# Patient Record
Sex: MALE | Race: WHITE
[De-identification: names, ages, dates, MRNs, and addresses within clinical notes are randomized per-mention and may not be internally consistent; named-entity substitution may affect disease eponyms.]

---

## 2021-10-11 ENCOUNTER — HOSPITAL ENCOUNTER (EMERGENCY)
Dept: HOSPITAL 35 - ER | Age: 81
Discharge: PSYCHIATRIC OR SUBSTANCE USE FACILITY | End: 2021-10-11
Payer: COMMERCIAL

## 2021-10-11 ENCOUNTER — HOSPITAL ENCOUNTER (INPATIENT)
Dept: HOSPITAL 35 - SBH | Age: 81
LOS: 18 days | Discharge: NURSING, REHAB OR LONG-TERM CARE | DRG: 881 | End: 2021-10-29
Attending: PSYCHIATRY & NEUROLOGY | Admitting: PSYCHIATRY & NEUROLOGY
Payer: COMMERCIAL

## 2021-10-11 VITALS — HEIGHT: 71 IN | BODY MASS INDEX: 28.84 KG/M2 | WEIGHT: 206 LBS

## 2021-10-11 VITALS — BODY MASS INDEX: 30.6 KG/M2 | HEIGHT: 72 IN | WEIGHT: 225.91 LBS

## 2021-10-11 VITALS — DIASTOLIC BLOOD PRESSURE: 83 MMHG | SYSTOLIC BLOOD PRESSURE: 153 MMHG

## 2021-10-11 DIAGNOSIS — G62.9: ICD-10-CM

## 2021-10-11 DIAGNOSIS — Z79.899: ICD-10-CM

## 2021-10-11 DIAGNOSIS — I25.2: ICD-10-CM

## 2021-10-11 DIAGNOSIS — Z79.82: ICD-10-CM

## 2021-10-11 DIAGNOSIS — Z66: ICD-10-CM
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
## 2021-10-11 DIAGNOSIS — E78.5: ICD-10-CM
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
## 2021-10-11 DIAGNOSIS — S42.301A: ICD-10-CM
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
## 2021-10-11 DIAGNOSIS — I25.10: ICD-10-CM

## 2021-10-11 DIAGNOSIS — F01.51: ICD-10-CM

## 2021-10-11 DIAGNOSIS — G47.00: ICD-10-CM
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
## 2021-10-11 DIAGNOSIS — Z79.1: ICD-10-CM

## 2021-10-11 DIAGNOSIS — I10: ICD-10-CM

## 2021-10-11 DIAGNOSIS — F32.9: Primary | ICD-10-CM

## 2021-10-11 DIAGNOSIS — R45.851: ICD-10-CM

## 2021-10-11 DIAGNOSIS — D72.829: ICD-10-CM

## 2021-10-11 DIAGNOSIS — Z20.822: ICD-10-CM

## 2021-10-11 DIAGNOSIS — N40.0: ICD-10-CM

## 2021-10-11 DIAGNOSIS — R41.82: Primary | ICD-10-CM

## 2021-10-11 DIAGNOSIS — K21.9: ICD-10-CM

## 2021-10-11 DIAGNOSIS — D64.9: ICD-10-CM

## 2021-10-11 DIAGNOSIS — Z79.891: ICD-10-CM

## 2021-10-11 DIAGNOSIS — Z28.21: ICD-10-CM

## 2021-10-11 LAB
ANION GAP SERPL CALC-SCNC: 9 MMOL/L (ref 7–16)
BASOPHILS NFR BLD AUTO: 0.3 % (ref 0–2)
BILIRUB UR-MCNC: NEGATIVE MG/DL
BUN SERPL-MCNC: 18 MG/DL (ref 7–18)
CALCIUM SERPL-MCNC: 9.3 MG/DL (ref 8.5–10.1)
CHLORIDE SERPL-SCNC: 101 MMOL/L (ref 98–107)
CO2 SERPL-SCNC: 29 MMOL/L (ref 21–32)
COLOR UR: YELLOW
CREAT SERPL-MCNC: 1 MG/DL (ref 0.7–1.3)
EOSINOPHIL NFR BLD: 0.3 % (ref 0–3)
ERYTHROCYTE [DISTWIDTH] IN BLOOD BY AUTOMATED COUNT: 13.8 % (ref 10.5–14.5)
GLUCOSE SERPL-MCNC: 132 MG/DL (ref 74–106)
GRANULOCYTES NFR BLD MANUAL: 80.9 % (ref 36–66)
HCT VFR BLD CALC: 39.9 % (ref 42–52)
HGB BLD-MCNC: 13.3 GM/DL (ref 14–18)
KETONES UR STRIP-MCNC: (no result) MG/DL
LYMPHOCYTES NFR BLD AUTO: 4.9 % (ref 24–44)
MCH RBC QN AUTO: 34.1 PG (ref 26–34)
MCHC RBC AUTO-ENTMCNC: 33.3 G/DL (ref 28–37)
MCV RBC: 102.3 FL (ref 80–100)
MONOCYTES NFR BLD: 13.6 % (ref 1–8)
NEUTROPHILS # BLD: 12.9 THOU/UL (ref 1.4–8.2)
PLATELET # BLD: 201 THOU/UL (ref 150–400)
POTASSIUM SERPL-SCNC: 4.7 MMOL/L (ref 3.5–5.1)
RBC # BLD AUTO: 3.9 MIL/UL (ref 4.5–6)
RBC # UR STRIP: (no result) /UL
RBC #/AREA URNS HPF: (no result) /HPF
SODIUM SERPL-SCNC: 139 MMOL/L (ref 136–145)
SP GR UR STRIP: >= 1.03 (ref 1–1.03)
URINE CLARITY: CLEAR
URINE GLUCOSE-RANDOM*: NEGATIVE
URINE LEUKOCYTES-REFLEX: NEGATIVE
URINE NITRITE-REFLEX: NEGATIVE
URINE PROTEIN (DIPSTICK): (no result)
URINE WBC-REFLEX: (no result) /HPF (ref 0–5)
UROBILINOGEN UR STRIP-ACNC: 1 E.U./DL (ref 0.2–1)
WBC # BLD AUTO: 16 THOU/UL (ref 4–11)

## 2021-10-11 PROCEDURE — 10880: CPT

## 2021-10-12 VITALS — SYSTOLIC BLOOD PRESSURE: 142 MMHG | DIASTOLIC BLOOD PRESSURE: 56 MMHG

## 2021-10-12 VITALS — DIASTOLIC BLOOD PRESSURE: 55 MMHG | SYSTOLIC BLOOD PRESSURE: 147 MMHG

## 2021-10-12 LAB
BACTERIA #/AREA URNS HPF: (no result) /HPF
BILIRUB UR-MCNC: NEGATIVE MG/DL
CHOLEST SERPL-MCNC: 96 MG/DL (ref ?–200)
COLOR UR: YELLOW
HDLC SERPL-MCNC: 37 MG/DL (ref 40–?)
KETONES UR STRIP-MCNC: NEGATIVE MG/DL
LDLC SERPL-MCNC: 44 MG/DL (ref ?–100)
MUCUS: (no result) STRN/LPF
NITRITE UR QL STRIP: NEGATIVE
RBC # UR STRIP: (no result) /UL
RBC #/AREA URNS HPF: (no result) /HPF
SP GR UR STRIP: >= 1.03 (ref 1–1.03)
SQUAMOUS: (no result) /LPF (ref 0–3)
TC:HDL: 2.6 RATIO
TRIGL SERPL-MCNC: 75 MG/DL (ref ?–150)
URINE CLARITY: CLEAR
URINE GLUCOSE-RANDOM*: NEGATIVE
URINE LEUKOCYTES: NEGATIVE
URINE PROTEIN (DIPSTICK): (no result)
UROBILINOGEN UR STRIP-ACNC: 1 E.U./DL (ref 0.2–1)
VLDLC SERPL CALC-MCNC: 15 MG/DL (ref ?–40)
WBC #/AREA URNS HPF: (no result) /HPF

## 2021-10-12 NOTE — NUR
PATIENT CAME INTO THE UNIT AT 1900. HE WAS ADMITED TO  UNDER THE CARE
DR LOVE.HE DENIES PAINS AND LUNGS ARE CLEAR BS ACTIVE X4 QUADS. HE IS
IS ALERT AND ORIENTED. MEDICATION ARE VERIFIED AND DOCUMENTED PER ORDER
 HE DENIES PAINS AND DISCOMFORT AT THIS TIME.DENIES SI/AVH/AH. CALL TO
HOSPITALIST AND SHE SAW THE PATIENT. NO SKIN ISSUE NOTED ON ASSESSMENT,BS
ACTIVE X4 QUADS, ABD IS SOFT NONE TENDER. HE IS COMFORTABLE WITH CARE AND
ADMISSION. PATIENT IS O FALL PRECAUTION. BED IS LOW, LOCKED AND ALARMED.
YELLOW AND SOCKS AND TOP ON . [id number] CHECK IS ONGOING. PERSONAL
ITEMS ARE NOTED.CONTINE CARE.

## 2021-10-12 NOTE — EKG
[address] [place]  [address]
Phone:  [phone]                    ELECTROCARDIOGRAM REPORT      
_______________________________________________________________________________
 
Name:       TAMIKO DELCID                  Room #:                     DEP FELIPE DOUGLASS#:      [id number]     Account #:      [id number]  
Admission:  10/11/21    Attend Phys:                          
Discharge:  10/11/21    Date of Birth:  [birth year]  
                                                          Report #: [id number]
   [id number]
_______________________________________________________________________________
                         [facility] ED
                                       
Test Date:    2021-10-11               Test Time:    16:16:34
Pat Name:     TAMIKO DELCID             Department:   
Patient ID:   [id number]            Room:          
Gender:       M                        Technician:   
:          [id number]               Requested By: Kike Mccarthy
Order Number: [id number] MD:   Carlos Jensen
                                 Measurements
Intervals                              Axis          
Rate:         84                       P:            46
CA:           155                      QRS:          63
QRSD:         97                       T:            4
QT:           353                                    
QTc:          418                                    
                           Interpretive Statements
Sinus rhythm
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
No previous ECG available for comparison
Electronically Signed On 10- 7:30:11 CDT by Carlos Jensen
[email or web address]
 
 
 
 
 
 
 
 
 
 
 
 
 
 
 
 
 
 
 
 
 
 
  <ELECTRONICALLY SIGNED>
   By: Carlos Jensen MD, [facility]    
  10/12/21     0730
D: 10/11/21 1616                           _____________________________________
T: 10/11/21 1616                           Carlos Jensen MD, FACC      /EPI

## 2021-10-12 NOTE — NUR
PATIENT CARE ASSUMED AT 0700, PATIENT SITTING IN THE DAY ROOM, ALERT AND
ORIENTED X3, PATIENT IS HARD OF HEARING EVEN WITH THE EARING AID, PATIENT IS
CALM AND COOPERATIVE WITH CARE, ACTIVE BOWEL SOUND WITH SOFT AND ROUNDED
AMBDOMEN, BRATH SOUND CLEAR, SKIN INTACT, NO EDEMA NOTED, PATIENT TOOK HIS
MEDICATION WHOLE WITHOUT ANY PROBLEM, PATIENT AMBULATE ON A CORBIN CHAIR, HE IS
2 TO 3 PERSON ASSIST, PATIENT RUNNING TEMP OF 99.6 .1, DOCTOR ARTEM
WAS NOTIFIED, HE ORDERED UA, THE RESULT CAME BACK WITH PROTEIN AND BLOOD IN
URINE, FALL PRECAUTION IN PLACE, NO BEHAVIOR CHANGES, PATIENT DENIES SI/HI,
WILL CONTINUE TO MONITOR PATIENT FOR SAFETY AND BEHAVIOR

## 2021-10-13 VITALS — SYSTOLIC BLOOD PRESSURE: 148 MMHG | DIASTOLIC BLOOD PRESSURE: 58 MMHG

## 2021-10-13 VITALS — DIASTOLIC BLOOD PRESSURE: 76 MMHG | SYSTOLIC BLOOD PRESSURE: 154 MMHG

## 2021-10-13 LAB
EST. AVERAGE GLUCOSE BLD GHB EST-MCNC: 111 MG/DL
GLYCOHEMOGLOBIN (HGB A1C): 5.5 % (ref 4.8–5.6)

## 2021-10-13 NOTE — NUR
New [facility] admit for depression/SI.  Advanced age 81, very hard hearing.  Febrile
past 2 days.  Intake 75% lunch yesterday with refusal dinner.  2 different wts
at admit 225 vs 204 lb. BMI 29. Follow wt/intake patterns but presents low
nutrition risk.

## 2021-10-13 NOTE — NUR
10---1600--Call from [facility], Darren (son)--[phone]--I advised him that
the test for flu that was done came back negative.  Son expressed concerns
about him not being up and moving and not having his cane (which is what he
uses at home.  He can't use a walker due to the injury to his left arm. Son
wondered if PT/OT could be ordered so he can get up and moving.  He is
concerned that a week prior to him coming here he appeared to be declining and
he didn't want him to slip further.  Advised son I will let the doctor know
his concerns and see of he wants to order PT/OT for him when he feels well
enough.

## 2021-10-13 NOTE — NUR
PATIENT HAS BEEN UP, AND OUT IN [facility], SITTING IN [facility], PARTICIPATES IN
GROUP THERAPY.  PATIENT TOOK ALL MEDICATION WHOLE WITHOUT DIFFICULTY, HE IS
EATING MEALS, AND DRINKING FLUID WELL. PATIENT DENIES SUICIDAL/HOMICIDAL
IDEATION. PATIENT REQUIRES MAX ASSIST OF STAFF TO TRANSFER, AND PROVIDE ADL
CARE. PATIENT'S TEMPRATURE THIS MORNING .3, TYLENOL GIVEN FOR LOWER
BACK PAIN, TEMP RECHECKED WITH RESULT OF 97.7. DR. LOVE AWARE. FLU SWAB
COMPLETED, SENT TO LAB, AWAITING RESULT. PATIENT IS TO ISOLATE TO ROOM TILL
THE FLU RESULT IS OUT. AFFECT IS FLAT/BLUNTED, MOOD IS CALM, EUTHYMIC. PATIENT
DENIES SUICIDAL IDEATION, ACKNOWLEDGES DEPRESSION, BUT NOT ABLE TO RATE IT. HE
STATES HE IS "ANXIOUS TO GO HOME". NO AGITATION OR AGGRESSIVE BEHAVIOR NOTED
AT THIS TIME, WILL MONITOR FOR SAFETY.

## 2021-10-13 NOTE — NUR
PATIENT SITTING UP IN DAY ROOM AT BEGINNING OF SHIFT, WHEN DOING ASSESSMENT,
PATIENT IS UNABLE TO FOLLOW ALONG IN CONVERSATION, WAS UNABLE TO ANSWER
ASSESSMENT QUESTIONS. TOOK 2 STAFF MEMBERS TO PUT IN BED WHERE HE IS IS
RESTING COMFORTABLY IN BED.

## 2021-10-13 NOTE — NUR
PATIENT SITTING IN RECLINER IN COMMON AREA. PATIENT IS ALERT AND ORIENTED TO
PERSON. PATIENT AT THIS TIME IS CALM AND COOPERATIVE. NOTICED THAT HE HAS MILD
TREMORS WHEN ADMINISTERING HIS HS MEDS. PATIENT REQUIRES ASSISTANCE TO
TRANSFER FROM CHAIR TO BED. DENIES PAIN OR NEEDS. NO DISTRESS NOTED. WILL
CONTINUE TO MONITOR FOR CHANGE IN STATUS.

## 2021-10-14 VITALS — DIASTOLIC BLOOD PRESSURE: 52 MMHG | SYSTOLIC BLOOD PRESSURE: 134 MMHG

## 2021-10-14 VITALS — DIASTOLIC BLOOD PRESSURE: 52 MMHG | SYSTOLIC BLOOD PRESSURE: 113 MMHG

## 2021-10-14 NOTE — NUR
10---5359--Went to do the SLUMS with patient.  He was asleep and when I
woke him up he stated he wanted to do it tomorrow AM.  I told him I would come
back after breakfast.

## 2021-10-14 NOTE — NUR
10---1115--Asked doctor about ordering OT and PT for the patient as
requested by NOAM, Darren (son).  Doctor checked and it has already been
started.

## 2021-10-14 NOTE — NUR
PATIENT RESTING IN THE COMMON AREA WATCHING TV. PT IS CALM AND COOPERATIVE. HE
IS AAOX2. COMPLIANT WITH MEDICATIONS AND TREATMENT. VSS. NO S/S OF DISTRESS.
PT IS UNSTEADY ON HIS FEET AND REQUIRES ASSISTANCE X2 WHEN TRANSFERRING.
PATIENT IS INCONTINENT. WILL CONTINUE TO MONITOR FOR CHANGES IN STATUS.

## 2021-10-14 NOTE — NUR
PATIENT CARE RESUMMED AT 0700. PATIENT IS A&O*2, LUNGS CLEAR, ABDOMEN SOFT
WITH BOWEL SOUNDS PRESENT IN ALL 4 QUADRANTS. PATIENT STATED HE SLEPT WELL.
PATIENT DENIES SI/HI/VH/AH. PATIENT STATED HE IS ANXIOUS ABOUT "GETTING A CAT
SCAN DONE." PATIENT UNSURE OF WHY HE IS HERE ON OUR UNIT. PATIENT STATES HE IS
A LITTLE DEPRESSED, SCALING AS A 1 ON A SCALE OF 0-10. PATIENT SAYS HE IS JUST
"LOOKING FORWARD TO MOVING ON." PATIENT SEEN BY OT TODAY, AND PARTICIPATED
WELL IN THERAPY. PATIENT IS HARD OF HEARING AND ASKS TO TAKE MASK OFF SO HE
CAN READ LIPS. 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 FALL PREVENTIONS
ARE IN PLACE, WILL CONTIUE TO MONITOR PATIENT FOR SAFETY AND BEHAVIORS.

## 2021-10-14 NOTE — NUR
10---1530--Call from DarrenSUHAMARGOT and his wife, Berto.  Darren was
wondering if we were still going to do the family meeting.  I told him we can
do that tomorrow and asked if 1430 would be okay.  He was agreeable to the
time and I told him I would talk to the doctor to determine if that time
worked for him.  Darren and his wife stated they were concerned that Ana Lilia
(wife) and the patient can't talk to each other on the portable phone on the
unit and she has never used TTD as this is technology that she is not
comfortable with. Darren states that he thinks patient's phone is not in
security but Ana Lilia has it.  He states the patient's phone and her hearing
aides are "bluetoothed" together. so they can talk together with his phone. He
will have Ana Lilia bring it tomorrow. (I will talk with my supervisor in the AM
to see if we can keep the phone in her office and I will supervise the phone
calls or the patient can do it in my office.)  I explained that we can use
an office in the outside of the unit so Ana Lilia can be present.  However I
explained the paitent will not be present and they cannot visit him.  Darren
wants to know why patient had the fever.  I told him I didn't have the answer
to that and to write down questions he has to ask tomorrow.  I told him
patient had a test for COVID and he flu and he was negative for both.  Son was
very concerned if his dad is going to get back to where he was a week and a
half ago.  I explained this was another question for the doctor but people
woith dementia can have something happen and then deteriorate quickly.  I
asked what his expectations were.  He would like for him to go home to Ana Lilia
but feels we need to be looking for a placement if he is going to be too much
for her to care for. I asked if Ana Lilia would consider an AL with him and he
thought she would.  I explained this is another question for the doctor.  I
assured him that I can start sendong out packets to AL facilities as soon as
they tell me where and see if the doctor thinks this is necessary yet.  Darren
wants to know "what's next".  I attempted to explain we can't tell him that
with any certainty.  He stated his understanding and is anxious for the
meeting tomorrow.
I explained PT/OT had done evals today and
we would get a report tomorrow in team meeting.  I explained they only see our
people M-W-F not daily.

## 2021-10-15 VITALS — SYSTOLIC BLOOD PRESSURE: 118 MMHG | DIASTOLIC BLOOD PRESSURE: 51 MMHG

## 2021-10-15 VITALS — DIASTOLIC BLOOD PRESSURE: 68 MMHG | SYSTOLIC BLOOD PRESSURE: 128 MMHG

## 2021-10-15 NOTE — NUR
Alert and orientated to person and knows he is in hospital.  Denies SI/HI.
Asking about eye drops and also asked about CPAP per another RN.  Able to
stand with assistance.  Breath sounds clear.  Reg HR auscultated.  Color pink
with brisk capillary refill and palpable peripheral pulses.  +1 edema in lower
legs, elevated.  Brief dry.  Active bowel sounds over soft, rounded abdomen.
BM last night per report.
 
Contacted son and DPOA Darren r/t eye drops.  He asked if pt had used CPAP
previous night.  Explained that per Josi in team meeting that he was not
using CPAP during admission.  Son disagreed and thought he was on CPAP.
Clarified with Josi and Dr. Natarajan, will discuss with son during family
meeting this afternoon.
 
Pt. sleeping most of day in recliner in day room.  No s/o distress.

## 2021-10-15 NOTE — NUR
PATIENT RESTING IN THE COMMON AREA WATCHING TV. PT IS AAOX2. STATES THAT HE
WAS TIRED AND WANTED TO GO TO BED. PT TOOK HIS MEDICATIONS AS ORDERED. PT WAS
TRANSFERRED TO HIS ROOM. PT DENIES PAIN OR NEEDS AT THIS TIME. VSS. NO
DISTRESS NOTED. WILL CONTINUE TO MONITOR.

## 2021-10-15 NOTE — NUR
10---0800--Spoke to supervisor re: the patient having his phone since
his wife's hearing aides were bluetoothed to his phone.  She is agreeable to
this.  She states she will keep it in her office if necessary.

## 2021-10-15 NOTE — NUR
10---2270--Patient was in the dayroom. He was sittoin dolores his
micha-chair, asleep.  Woke patient up and explained we would be completing the
SLUMS this morning.  He was agreeable. He appeared groggy. SLUMS completed but
I didn't score it because the clock didn't have all the numbers on it.  He
only put the 12-3-6 and 9.  It did appear he got the time right.  Even with
this four points added in he will only score 14 which indicates substantial
dementia.  Will talk to doctor at team meeting this morning.

## 2021-10-16 VITALS — DIASTOLIC BLOOD PRESSURE: 54 MMHG | SYSTOLIC BLOOD PRESSURE: 125 MMHG

## 2021-10-16 NOTE — NUR
Alert and orientated to person and place.  Multiple requests to speak with
wife, called wife x2 and son this AM.  Denies SI/HI.  Slightly irritable this
AM but displaying humor later in AM.  Conversive and participating in activity
group.  Breath sounds clear.  Reg HR auscultated.  Color pink with brisk
capillary refill and palpable peripheral pulses.  +1-2 edema in lower
extremities.  Incontinent of yellow urine and large, soft dk brown stool.
Active bowel sounds over soft, rounded abdomen.  Able to stand several times
and transfer to  from recliner, pulling himself around in WC.  Currently
sitting in  watching TV.

## 2021-10-16 NOTE — NUR
PATIENT RESTING IN THE COMMON AREA. STATES THAT HE IS READY FOR BED UPON HS
SHIFT ARRIVAL. TALKED WITH PATIENT THAT HE NEEDED HID MEDICATION BEFORE GOING
TO BED. PT COMPLIED. TOOK ALL MEDICATIONS. WAS TRANSFERED TO HIS ROOM VIA
WHEELCHAIR. PATIENT DENIES PAIN OR NEEDS AT THIS TIME. NO DISTRESS NOTED. HE
IS AAOX3. WILL CONTINUE TO MONITOR FOR CHANGES IN STATUS.

## 2021-10-17 VITALS — SYSTOLIC BLOOD PRESSURE: 146 MMHG | DIASTOLIC BLOOD PRESSURE: 69 MMHG

## 2021-10-17 VITALS — DIASTOLIC BLOOD PRESSURE: 69 MMHG | SYSTOLIC BLOOD PRESSURE: 146 MMHG

## 2021-10-17 VITALS — DIASTOLIC BLOOD PRESSURE: 77 MMHG | SYSTOLIC BLOOD PRESSURE: 154 MMHG

## 2021-10-17 NOTE — NUR
This RN was sitting with pt while he used his personal cell phone to talk to
his wife, as this was previously approved for pt. Pt began to appear worked
up and raising his voice to his wife and son. He told his wife "If you don't
come up here right now to talk to me, I'm commiting suicide". This RN tried to
explain to pt that we are not currently having visiting hours and that it is
not her fault she cannot visit. Pt appeared very focused on this and did not
appear to care to listen to this explaination. It was also explained to pt to
please try and remain calm on the phone. He also mentioned not eating today
and that he was not going to eat tomorrow, stating "How long do you think I'll
last!?". Pt also voiced mutiple complaints about being here and pt's family
tried to explain to him he is here due to his suicidal thoughts and that it is
not safe for pt to return home at this time, as pt was demanding them to come
pick him up. Pt also made a comment of "I wish I would've just went to the
basement". At the end of the phone call pt stated "well can you guys promise
me to call everyday?" and the family agreed. Pt's RN, Mae, then called pt's
wife to give her an update and discuss the conversation pt just had with them.

## 2021-10-17 NOTE — NUR
PATIENT CARE RESUMMED, PATIENT UP IN CORBIN-CHAIR FOR MEALS AND GROUPS. PATIENT
AMBULATORY TO AND FROM BATHROOM WITH 1-ASSIST, WALKER AND GAIT BELT. PATIENT
IS UNSTEADY AT TIMES. PATIENT A&O*2 (SELF & TIME), LAST BOWEL MOVEMENT NOTED
ON 10/17/21. PATIENT DENIES HAVING ANY PAIN AT THIS, BUT DID STATE TO THE LPN
"I HAVE CHRONIC BACK PAIN, AND IT DOESN'T HURT UNLESS IM IN THE WRONG
POSITION." PATIENT STATED HE SLEPT THE BEST HE EVER DID LAST NIGHT. PATIENT
PRESENTED IN A PLEASENT MOOD, SMILING AND HAPPY. PATIENT IS MEDICATION AND
MEAL COMPLIANT. DENIES SI/HI/AH/VH, DEPRESSION, AND ANXIETY. PATIENTS LUNG
SOUNDS ARE CLEAR, ABDOMEN IS SOFT, AND BOWEL SOUNDS ARE PRESENT IN ALL 4
QUADRANTS. PATIENT DID RECIEVE A PHONE CALL TODAY FROM HIS WIFE, WHICH DID
SEEM TO CAUSE THE PATIENT SOME DISTRESS. PATIENT WITNESSED RAISING HIS VOICE
TO HIS WIFE TO GET HIM THE HELL OUT OF HERE, AND GET HERE WITH THE PAPERWORK
TO GET HIM OUT. LAVELLE LATER CALMED DOWN, AND TOOK A NAP IN HIS CORBIN-CHAIR
WATCHING THE [employer] GAME. FALL PRECAUIONS ARE IN PLACE, WILL CONTINUE TO
MONITOR THE PATIENT FOR SAFETY AND BEHAVIORS.

## 2021-10-18 VITALS — SYSTOLIC BLOOD PRESSURE: 145 MMHG | DIASTOLIC BLOOD PRESSURE: 73 MMHG

## 2021-10-18 VITALS — DIASTOLIC BLOOD PRESSURE: 80 MMHG | SYSTOLIC BLOOD PRESSURE: 154 MMHG

## 2021-10-18 NOTE — NUR
10---1105--Call to Darren to obtain Ana Lilia's number ([phone]) He will
be at her house for call at 3:00 PM.  He states she and his wife, Berto, are
out looking at facilities today and will let me know what they find so
paperwork can be sent.

## 2021-10-18 NOTE — NUR
10---1500--Family meeting in the building--Patient not present--Meeting
in the building because of wife's hearing deficit.  Also present: Patient's
son Darren; Darren's wife was on the phone; Dr. Natarajan and this worker.
Educated family about the possible need for skilled nursing when he is DC'd
from here.  Doctor explained the issues (not walking; PT just starting)
Family to discuss facillities they might be interested.  Son will go to
patient's wife house for phone call to tell worker what has been decided. He
will help patient's wife if she can't hear.

## 2021-10-18 NOTE — NUR
PATIENT SAT UP IN CORBIN CHAIR AT A TABLE IN DINING ROOM THIS EVENING UNTIL
BEDTIME. PATIENT IS A/0X4. PATIENT HIS [place] AND WEARS 2 HEARING AIDS. ORDER
RECEIVED FOR OKAY FOR PATIENT TO USE CPAP MACHINE WITHOUT A 1:1 AS LONG AS NO
SI AND ROOM IS CLOSE TO NURSE STATION FOR OBSERVATION. PATIENT DENIES SI/HI TO
THIS NURSE. HOWEVER, DAY NURSE REPORTS THAT WHEN HE TALKS WITH FAMILY HE
THREATENS TO KILL HIMSELF IF THEY DON'T COME GET HIM AND HE TELLS FAMILY THAT
WE ARE NOT FEEDING HIM WHEN HE HAS BEEN EATING VERY WELL. BELIEVE PATIENT IS
TRYING TO MANIPULATE FAMILY TO CATER TO HIM. PATIENT HAS BEEN CALM AND
COOPERATIVE TONIGHT. PATIENT EAGER TO START PT TO AMBULATE WITH WALKER
HOPEFULLY SOON. PATIENT IS UP TO BSC WITH ASSIST X 1-2. HE DENIES PAIN. DENIES
SI/HI/AVH. CPAP PLACED ON PATIENT AT BED TIME AND WORKING. PT REMOVES MASK AND
PUTS BACK ON PERIODICALLY. PATIENT AWOKE FOR TIME CHECK AND WATER TO DRINK.
CONTINUES TO BE PLEASANT AND COOPERATIVE. HE HAD HS SNACK AND TOOK HIS MEDS
WHOLE WITH WATER. BED IN LOW POSITION AND BED ALARM IS ON. ROUTINE ROUNDS TO
ASSESS SAFETY AND STATUS OF PATIENT. CHAIR ALARM WAS ON AND IN PLACE WHEN
PATIENT WAS UP IN CORBIN CHAIR.

## 2021-10-18 NOTE — NUR
Assumed pt care at 0700. pt was alert and oriented x4. Assessments completed,
vss. Lungs clear, active bowel sound. Denies si/hi, denies pain at this time.
Took meds whole, no difficulty noted. Ambulates with a Delmy chair. Calm and
cooperative with care. Continent of bowel and bladder. Make needs known to
staff. Pt son called to speak to pt. Phone was handed to pt. At this time pt
is in room resting will continue to monitor.

## 2021-10-18 NOTE — NUR
10---1500--Call to Ana Lilia (patient's wife) ([phone]) with patient's
son (NOAM) Aura and his wife Berto on the phone.  They called about a few
homes today that have not called them back.  They would like me to fax his
information to four facilitiesdd:  [facility]; [facility];
[facility]; and [facility].  They wanted
copies of OT/PT reports e-mailed to aura at [email or web address].  Let MD no
he can do thois but OI can not due to HIPPA.  They need to request these
through Medical records ([phone]).  I mentioned that patient had refused
therapy with the PT.  All stated we told them we would call them immediately
if this happened and they would talk to him.  Explained therapists do not come
to us immediately with this information.  I didn't know if they would try
later in the day.  Family was angry that I hadn't called them immediately when
he refused because the doctor and I said we would.
10---2646--Call to NOAM Aura and advised I will ask doctor to email
reports otherwise they would have to ask for them from medical records.

## 2021-10-18 NOTE — NUR
10---1115--Call from Ana Lilia (wife) wanting to leave patient a
message--"I love you and your son and I are getting things done".  Message
given to patient.  He was happy to receive it.  Reminded Ana Liila that we have a
meeting on the phone at 3 and Darren will be at her house to help her with the
call.  She stated she remembers.

## 2021-10-18 NOTE — NUR
RT Progress Note- Thom has been present in the milieu each day since his
admission. He has also been present in group therapy, however his
participation is limited at times d/t functioning of his hearing aids. He
expresses difficulty hearing in group situations where there is background
noise. Thom has not had any behaviors or statements of suicide during RT
programming. [facility] will continue to encourage his participation.

## 2021-10-19 VITALS — DIASTOLIC BLOOD PRESSURE: 73 MMHG | SYSTOLIC BLOOD PRESSURE: 150 MMHG

## 2021-10-19 VITALS — SYSTOLIC BLOOD PRESSURE: 137 MMHG | DIASTOLIC BLOOD PRESSURE: 68 MMHG

## 2021-10-19 VITALS — SYSTOLIC BLOOD PRESSURE: 150 MMHG | DIASTOLIC BLOOD PRESSURE: 73 MMHG

## 2021-10-19 NOTE — NUR
Followup: remains on SBU.  No longer febrile.  Intake is % now.  New wt
of 206 lb, up 2 lb from last weight. Remains on regular diet.  Low nutrition
risk

## 2021-10-19 NOTE — NUR
PATIENT APPEARS ALERT AND ORIENTED BUT AT TIMES IS FORGETFUL. PATIENT TAKEN
DOWNSTAIRS THIS EVENING FOR A CT SCAN. PATIENT IS MEICATION COMPLIANT AND USES
A CORBIN CHAIR FOR GETTING AROUND. PATIENT HAS BEEN CALM AND COOPERATIVE DURING
THIS SHIFT.

## 2021-10-19 NOTE — NUR
10---1230--Call from [facility].  Message left for me
stating they werer going to deny the referal for this pagtient as they
couldn't meet his care needs and they didn't have long term care.

## 2021-10-19 NOTE — NUR
PATIENT CARE RESUMMED, PATIENT IN BED LYING IN A LOW FOWLERS POSITION. PATIENT
MEDICATION AND MEAL COMPLIANT. PATIENT PRESENTED TO LPN IN A POSITIVE, HAPPY,
SMILING APPERANCE. PATIENT IS A&O*4, ABDOMEN IS SOFT WITH BOWEL SOUNDS
PRESENT, LUNG SOUNDS ARE CLEAR. PATIENT DENIES SI/HI/AVH, DENIES
DEPRESSION/ANXIETY, AND DENIES HAVING ANY PAIN. PATIENT HAD HEARING AIDS AND
SHOES ON TODAY. PATIENT WAS SEEN BY OT AND PT TODAY. PATIENT IS CONTINENT OF
BOWEL AND BLADDER AND ALERTS STAFF WHEN ASSISTANCE IS NEEDED. PATIENT NOTED TO
HAVE DRY/WHITE MUCOUS MEMBRANES, INCREASED FLUIDS HAVE BEEN NOTED. PATIENTS
VITALS ARE WNL. FALL PRECAUTIONS ARE IN PLACE, WILL CONTINUE TO MONITOR
PATIENT FOR SAFETY AND BEHAVIORS.

## 2021-10-19 NOTE — NUR
I have reviewed the documentation by SAMANTHA COREAS from 10/19/21 to
10/19/21 and I concur with it.
YULY ARANA

## 2021-10-20 VITALS — SYSTOLIC BLOOD PRESSURE: 133 MMHG | DIASTOLIC BLOOD PRESSURE: 69 MMHG

## 2021-10-20 VITALS — DIASTOLIC BLOOD PRESSURE: 63 MMHG | SYSTOLIC BLOOD PRESSURE: 143 MMHG

## 2021-10-20 VITALS — SYSTOLIC BLOOD PRESSURE: 143 MMHG | DIASTOLIC BLOOD PRESSURE: 63 MMHG

## 2021-10-20 NOTE — NUR
10---0700--Message left from patient's son re: [email or web address] and stating he
thought I was going to call him daily with updates (something I never agreed
to).  He wanted to know about facilities accepting the patient and so far the
only one who called after referrals sent to facilities they requested was [facility] and they denied.  Will call other facilities this AM and return his
call. He stated he had been expecting him to be DC'd somewhere today, "but I
suppose that isn't going to happen now".

## 2021-10-20 NOTE — NUR
10---6952--Call to patient's son as requested in his message from
yesterday.  Advised the only one that responded out of the five I sent was [facility] and they denied.  I asked if he had others he wanted me to try and
explained I would call the other four after 0900 today and try to talk to
someone in admissions.  He stated his Wife had talked to a facility that was
supposed to "reach out" to me and I advised I have had no calls or messages
from anyone except [facility] (as stated above) but I would check with the other SW
to see if the call might have gone to her when she comes in.

## 2021-10-20 NOTE — NUR
PATIENT WAS OVERHEARD ON THE PHONE WITH WIFE ASKING HERE TO PICK HIM UP AND IF
SHE DIDN'T THAT HE WAS GOING TO KILL HIMSELF. WAS TOLD IN REPORT THAT HE SAYS
THE SAMETHING EVERYTIME HE IS TALKING TO WIFE OR SON. I SIT DOWN WITH PATIENT
AND ASKED WHY HE KEPT TALKING ABOUT KILLING HIMSELF WITH HIS FAMILY AND DENIED
SI TO US. PATIENT STATED HE GOT ANGRY BECAUSE THEY WOULD NOT COME GET HIM. I
ADVISED PATIENT IF HE KEPT TALKING LIKE THAT HE MIGHT HAVE TO STAY LONGER
BECAUSE WE COULD NOT DISCHARGE HIM IF HE WAS A DANGER TO HIMSELF. PATIENT
THANK ME REPEATLY FOR THAT INFORMATION. PATIENT IS MED COMPLIANT AND HAS SLEPT
THROUGH THE NIGHT.

## 2021-10-20 NOTE — NUR
PATIENT CARE ASSUMED AT 0700 - SITTING IN CORBIN CHAIR. PLEASANT AND RESPONSIVE
WITH HESITATION TO QUESTIONS ADDRESSED TO HIM. PATIENT UNABLE TO AMBULATE -
MAKES NEEDS KNOWN. GOOD APPETITE. ALERT TO SELF - UNABLE TO TELL THIS WRITER
WHERE HE IS OR WHAT DAY OR DATE IT CURRENTLY IS. ABLE TO TALK ABOUT EVENTS
FROM THE PAST BUT SHORT TERM MEMORY LIMITED.  NO AGITATION OF AGGRESSIVE
BEHAVIOR. CALM AND REDIRECTABLE AND PLEASANT. DENIES S/I CURRENTLY - ADMITS
HAS HAD THOUGHTS OF IT IN THE PAST - COMPLIANT WITH MEDICATIONS. DENIES ANY
PAIN DISCOMFORT WHEN ASKED. WILL CONTINUE TO MONITOR PATIENT FOR ANY CHANGES
IN BEHAVIOR OR CONCERNS AND ADDRESS ACCORDINGLY.

## 2021-10-21 VITALS — SYSTOLIC BLOOD PRESSURE: 126 MMHG | DIASTOLIC BLOOD PRESSURE: 58 MMHG

## 2021-10-21 VITALS — DIASTOLIC BLOOD PRESSURE: 58 MMHG | SYSTOLIC BLOOD PRESSURE: 121 MMHG

## 2021-10-21 NOTE — NUR
I have reviewed the documentation by SAMANTHA COREAS from 10/21/21 to
10/21/21 and I concur with it.
YULY ARANA

## 2021-10-21 NOTE — NUR
Alert and orientated to person and place.  Appropriate responses to
questions/conversation.  Denies SI/HI.  Ambulates with slow steady gait with
walker with assistance.  Breath sounds clear.  Reg HR auscultated.  Color pink
with brisk capillary refill and palpable peripheral pulses.  + 1 edema in
lower legs, improved from Saturday.  Brief dry.  Active bowel sounds over
soft, rounded abdomen.  Sitting in recliner in day room without s/o distress.

## 2021-10-21 NOTE — NUR
PATIENT REQUESTED TO GO TO BED T SHIFT CHANGE, PATIENT HAS BEEN CALM AND
COOPERATIVE WITH STAFF, MEDICATION COMPLIANT AND HAD A NICE TALK WITH HIS SON
LAST NIGHT. PATIENT DENIES ANY PSYCH ISSUES.

## 2021-10-21 NOTE — NUR
MADELEINE received notice that [facility] MADELEINE
spoke with pt's son Darren who said it was okay to send referrals outside of [facility]
and find a skilled placement for pt.
 
MADELEINE will continue to follow pt during her stay on this unit.

## 2021-10-22 VITALS — DIASTOLIC BLOOD PRESSURE: 55 MMHG | SYSTOLIC BLOOD PRESSURE: 111 MMHG

## 2021-10-22 VITALS — SYSTOLIC BLOOD PRESSURE: 123 MMHG | DIASTOLIC BLOOD PRESSURE: 68 MMHG

## 2021-10-22 NOTE — NUR
10---0650--Patient has been accepted Monday 10-@
[facility].  Will set up transportation with express unless son
wishes to transport.  I will contact son today after verifying time with the
facility.

## 2021-10-22 NOTE — NUR
AT ONSET OF NIGHT SHIFT PT WAS SITTING IN DAY ROOM WATCHING TV. THIS SHIFT PT
WAS ALERT AND ORIENTED TO PERSON AND PLACE. PT STATED THE DATE WAS 10/30/21.
PT STATED HE CAME TO THE HOSPITAL DUE TO HAVING MEMORY PROBLEMS. PT WAS CALM,
PLEASANT AND COOPERATIVE WITH BROAD AFFECT. PT WAS COMPLIANT WITH MEDICATION
AND VITAL SIGNS. PT DENIED SI, HI AND AVH. PT STATED HE DOES FEEL DEPRESSED A
LITTLE, BUT THE DEPRESSION IS DUE TO BEING IN THE HOSPITAL. PT STATED HE WOULD
LIKE TO DISCHARGE SOON. PT USED HIS HOME CPAP OVERNIGHT. FALL PRECAUTIONS ARE
IN PLACE.

## 2021-10-22 NOTE — NUR
Assumed care from overnight shift this am. Client was in activity area sitting
in chair resting. Client expressed that he was disappointed in staying on
facility until Monday, as staff had helped him call his wife so he could speak
to her, and she gave him this update. LPN used therapeutic communication to
assure client that he would be discharged to [facility] on Monday, and that he could
call friends and family until then. Client stated that he was thankful for
this support. Client expressed feeling anxious and a little depressed about
this initially, but stated that his anxiety abated with staff support. Client
denied feelings of suicide and homicide. Lung sounds were clear; bowel sounds
present. Client presented alert and oriented x3 at this time. No further
concerns. Will continue to monitor for safety and psychiatric concerns.

## 2021-10-23 VITALS — DIASTOLIC BLOOD PRESSURE: 90 MMHG | SYSTOLIC BLOOD PRESSURE: 150 MMHG

## 2021-10-23 VITALS — SYSTOLIC BLOOD PRESSURE: 125 MMHG | DIASTOLIC BLOOD PRESSURE: 69 MMHG

## 2021-10-23 NOTE — NUR
Assumed pt care at 0700. pt was alert and oriented to person, and place.
Assessments completed, vss. Lungs clear, active bowel sound. Took med whole,
no difficulty noted. Denies si/hi. No c/o pain at this time. Ambulates with a
Delmy CHAIR. NO SIGN OF ACUTE DISTRESS NOTED UPON ASSESSMENTS. Pt is a max
assist. Incontinent of bowel and bladder. Makes needs known to staff. Calm and
cooperative with care. At this time pt is eating lunch. Will continue to
monitor.

## 2021-10-23 NOTE — NUR
AT ONSET OF NIGHT SHIFT PT WAS SITTING IN CORBIN CHAIR IN DAY ROOM. THIS SHIFT
PT WAS ORIENTED X3. PT WAS COOPERATIVE WITH MEDICATIONS AND VITAL SIGNS. PT
WAS CALM, PLEASANT AND COOPERATIVE. AFFECT WAS BROAD. PT SMILED WHEN SPEAKING
WITH RN. PT STATED HE IS READY TO DISCHARGE. PT IS FRUSTRATED HE IS STILL
HERE. PT STATED HE HAS BEEN TOLD DIFFERENT DISCHARGE DATES AND DOES NOT KNOW
WHAT TO BELIEVE. PT DENIED SI. PT USED HOME CPAP. FALL PRECAUTIONS ARE IN
PLACE. WILL CONTINUE TO MONITOR.

## 2021-10-24 VITALS — SYSTOLIC BLOOD PRESSURE: 158 MMHG | DIASTOLIC BLOOD PRESSURE: 65 MMHG

## 2021-10-24 VITALS — SYSTOLIC BLOOD PRESSURE: 121 MMHG | DIASTOLIC BLOOD PRESSURE: 55 MMHG

## 2021-10-24 NOTE — NUR
Thom was alert and oriented to self, place, stituation, and loosly to time.
He was able to tell this RN that it was 2021 and that Katrin is
approaching, but could not recall the month. Pt was calm and cooperative
throughout the day, and pleasant upon approach. He was noted smiling at times
throughout the day and was medication compliant, without difficulty. This
afternoon when explaining that group was about to start and that he could call
his family after group, pt did not like the rule of no phones during group
time and responded by, "that's bullshit", but later after phone call was
completed pt thanked this RN and was appreciative. Pt told his family that he
was not eating, and mentioned "so I don't know how much longer I'll last", but
pt did eat all three meals this shift. Pt is hoping to D/C tomorrow which pt's
son, Darren, also reiterated to pt is the tentative and hopeful plan. Pt denied
SI to this RN this shift. Will continue to monitor.

## 2021-10-24 NOTE — NUR
ASSUMED CARE ON 10/23/21 @ 1900, RETIRED TO BED @ , COOPERATIVE WITH
ASSESSMENT AND COMPLIANT WITH MEDIATION, TAKING MEDS WHOLE WITH WATER.
ONLY SLEPT 2.4 HOURS ORIENTED X2 TO PERSON AND PLACE. BED IN LOW POSITION, BED
ALARM SET, WILL CONTINUE TO MONITOR FOR SAFETY AND COMFORT.

## 2021-10-25 VITALS — SYSTOLIC BLOOD PRESSURE: 125 MMHG | DIASTOLIC BLOOD PRESSURE: 64 MMHG

## 2021-10-25 VITALS — SYSTOLIC BLOOD PRESSURE: 140 MMHG | DIASTOLIC BLOOD PRESSURE: 79 MMHG

## 2021-10-25 VITALS — DIASTOLIC BLOOD PRESSURE: 64 MMHG | SYSTOLIC BLOOD PRESSURE: 125 MMHG

## 2021-10-25 LAB
BILIRUB UR-MCNC: NEGATIVE MG/DL
COLOR UR: YELLOW
KETONES UR STRIP-MCNC: NEGATIVE MG/DL
RBC # UR STRIP: NEGATIVE /UL
SP GR UR STRIP: >= 1.03 (ref 1–1.03)
URINE CLARITY: CLEAR
URINE GLUCOSE-RANDOM*: NEGATIVE
URINE LEUKOCYTES-REFLEX: NEGATIVE
URINE NITRITE-REFLEX: NEGATIVE
URINE PROTEIN (DIPSTICK): NEGATIVE
UROBILINOGEN UR STRIP-ACNC: 1 E.U./DL (ref 0.2–1)

## 2021-10-25 NOTE — NUR
10---1415--Call from Cat who thought she might be able to get patient
into [facility] in [place], [place]. (She states this is a behavioral unit.)
She will follow up with marketing and let me know.

## 2021-10-25 NOTE — NUR
10---4769--Call to Darren to advise that [facility] denied because hey
can't meet his needs and feel he needs memory care. I told him I called [facility] and left a message and I am waiting for them to call me back.  He sounded
very frusytrated and I apologized and explained this is often what happens
with any patient.  I advised him if he thought of any more to let me know and
told him [facility] had sent 12 referrals on Friday and I haven't had any messages
about them.

## 2021-10-25 NOTE — NUR
10---1600--Call to Thom rangel: referral at [facility].  He stated the
state has been in their facility and no one has had time to look at the
referral.  He promised they would look at it in the AM and call me with an
answer.

## 2021-10-25 NOTE — NUR
10---1600--Call to [facility].  I asked for marketing, DON or
. Put on hold again--[address] number [phone]. Called to
talk to . Put on hold again--Told  was in a meeting.
Told  what I needed and she stated the marketing person was
sitting right there and she would ask him.--Put on hold--

## 2021-10-25 NOTE — NUR
Thom was alert and oriented x4 throughout the day. He presented with a flat
affect and expresed frustration that he was hoping he was leaving today. He
refused to work with PT this shift, and when asked why, he stated "I was
hoping to leave today". Pt has also had episodes of incontinence and was
encouraged to let staff know when he has to use the bathroom as pt had
previously been doing. Per pt's son, Darren, pt previously had difficulties with
incontinence but had made much improvements PTA, such as walking around the
block with his wife every day, and feels as though he is starting to decline.
Pt was cleaned up around lunch, and was noted to have a jelly like substance
in his kh[place]boo area that did not appear to be from an ointment, order received
for a UA that was collected at 1730 when pt was taken to his room after bed.
Pt was able to urinate in a urinal at that time. Pt asked for this RN to call
his wife tomorrow and tell her to pick him up. Discharge/placement education
was given as pt appears to struggle with the discharge process and pt voiced
frustrations regarding this. Pt has no new orders at this time, will continue
to monitor.

## 2021-10-25 NOTE — NUR
10---1500--Call from Darren re: [facility].  I advised him they denied.  He
asked me o call [facility].  Call made and msg. left for marketing to call
me back.  Told him about a possible placement at [facility] in [place] that
might be available.  It is a facility for behaviors.

## 2021-10-25 NOTE — NUR
PATIENT SAT UP IN CORBIN CHAIR IN DINING ROOM UNTIL BED TIME THIS EVENING. HE
HAS BEEN CALM AND COOPERATIVE. A/0X3-4. HE STATES THAT HIS SON IS PICKING HIM
UP TOMORROW FOR A LUNCH DATE. NOTES STATE THAT [facility] IS READY TO ACCEPT
PATIENT 10/25/21. UNSURE OF DETAILS BUT SON MAY BE TAKING PATIENT THERE. DR LOVE CALLED AND UPDATED ON THIS. PATIENT DENIES SI/HI/AVH. HE TOOK HIS HS
MEDS WHOLE AND WITH WATER. PT IS ASSISTX 2 TO BED. BED IN LOW POSITION AND
BED ALARM IS ON. CONTINUING TO MONITOR.

## 2021-10-25 NOTE — NUR
10---1135--Beth in [facility] stated they ha not received the fax.  I resent
the faxes to them. Call made to NOAM [facility] and advised him Beth on [place]
denied and Beth is now looking at the paperwork. I advised him I would call
him if I hear from Beth.

## 2021-10-26 VITALS — DIASTOLIC BLOOD PRESSURE: 48 MMHG | SYSTOLIC BLOOD PRESSURE: 123 MMHG

## 2021-10-26 VITALS — DIASTOLIC BLOOD PRESSURE: 66 MMHG | SYSTOLIC BLOOD PRESSURE: 120 MMHG

## 2021-10-26 VITALS — SYSTOLIC BLOOD PRESSURE: 123 MMHG | DIASTOLIC BLOOD PRESSURE: 48 MMHG

## 2021-10-26 NOTE — NUR
Followup: remains on [facility].  Eating % of meals.  Pt eagerly awaiting
discharge.  Vitamin D deficiency and has vitamin D and calcium carbonate
ordered.  Presents low nutrition risk

## 2021-10-26 NOTE — NUR
PATIENT HAS BEEN IN BED ALL EVENING. HE STATES HE WAS DISAPPOINTED BECAUSE HE
THOUGHT HE WAS GOING TO BE DC/D TODAY. HE STATES HE HAD SPOKEN WITH HIS WIFE
AND SHE TOLD HIM THAT THEY HAVEN'T FOUND A PLACE FOR HIM TO GO TO YET. HE IS
WANTING DISCHARGE ASAP.  PATIENT DENIES PAIN. DENIES SI/HI/AVH. HE HAS BEEN
APPROPRIATE AND A/0X4. PATIENT HAS URINAL AT BEDSIDE AND WALKER. HE IS A FALL
RISK AND BED IS IN LOW POSITION AND BED ALARM IS ON. PT STATES HE HAS NOT HAD
AN APPETITE LATELY AND FEELS IT'S BECAUSE HE IS DEPRESSED. ENCOURAGED HIM TO
EAT TO KEEP UP HIS STRENGTH FOR PT AND REHAB AND TO BE ABLE TO DISCHARGE FOR
FURTHER REHAB. HE AGREED. I TOLD HIM OUR GOAL IS TO KEEP HIM HERE LONG ENOUGH
TO HELP HIM GET STABLIZED ON HIS MEDS AND BEHAVIORS AND THEN HE WILL MOVE ON.
ENCOURAGED HIM TO WORK WITH THE DOCTORS AND THEIR PLANS TO HEAL MORE QUICKLY.
HE THANKED ME FOR THE ENCOURAGEMENT AND WAS GOING TO TRY AND GET SOME SLEEP.
HE HAS NOT BEEN INCONTINENT SO FAR TONIGHT. HE TOOK HIS MEDS WHOLE WITH WATER.
ROUTINE ROUNDS TO ASSESS SAFETY AND STATUS OF PATIENT.

## 2021-10-26 NOTE — NUR
MADELEINE and Dr. Natarajan held an impromptu family meeting with Leatha [facility], and
 Gaby Stanford. Gaby said she will be helping pt's family
apply for [place] medicaid; this will allow Leatha to keep her AG. They do have
enough money to pay for 3-6 months of care out of pocket. Dr. Natarajan okayed
the plan of placement in [place] that accepts Medicaid with the idea that pt's
family will pay up front until his application has been accepted.
 
MADELEINE sent referrals to the following:
 
[facility]
 
SW team notified the family of such by email.
 
SW team will continue to follow pt during his stay on this unit.

## 2021-10-26 NOTE — NUR
MADELEINE spoke with Riri ROJAS) with [facility] who initially said they denied
clinically due to pt needing memory care; they are afraid of elopement. MADELEINE
explained that pt is not an elopement risk at all. Riri asked MADELEINE to call
Nicholas at [phone] so they may rethink referral.
 
MADELEINE contacted Nicholas and left a msg.
 
Pts family sent email to MADELEINE explaining that [facility] is their preferred choice.
 
MADELEINE team will continue to follow pt during his stay on this unit.

## 2021-10-26 NOTE — NUR
Pt was alert and oriented x4 this shift. He was calm, cooperative, and
pleasant throughout the day but did appear more tired this shift. Pt started
the day off by sleeping through breakfast but was easily arousable and then
wished to eat breakfast, which he at 100% of. He was medication and meal
compliant, without difficulty throughout the day. He was withdrawn but
appropriate upon approach and appears hopeful and optimistic to be discharged
soon. We spoke about him being from [place] and talked about what he used to
do for work which pt appeared to enjoy, and was smiling and laughing during
the conversation. Pt was in the dayroom for the entire shift. Pt was noted to
have a jelly like substance that was coming from his rectum which was seen by
this RN yesterday, but OT noted it today coming from his rectum after having
gas. PT was nearby and also stated they experienced the same thing with pt on
Friday. This was reported to Dr. Natarajan and page sent to Dr. Valles. Orders
received for a suppository as pt has not had a BM since Saturday and believes
it may be due to constipation. Suppository given this evening after dinner and
pt is currently laying in bed waiting for suppository to work. Will continue
to monitor.

## 2021-10-27 VITALS — SYSTOLIC BLOOD PRESSURE: 135 MMHG | DIASTOLIC BLOOD PRESSURE: 54 MMHG

## 2021-10-27 VITALS — SYSTOLIC BLOOD PRESSURE: 107 MMHG | DIASTOLIC BLOOD PRESSURE: 55 MMHG

## 2021-10-27 NOTE — NUR
Assumed pt care at 0700. Pt was alert and oriented to person, situation and
time. Assessment completed, vss. Lungs clear, active bowel sound. Took meds
whole with thin liquid, no difficulty noted. Ambulates with a Delmy chair.
Irritable with care. Pt bargains with care and med admin AT 9AM. Incontinent
of bowel and bladder. Makes needs known to staff. Called son and wife this
AM. Meds administered as scheduled. pt is frustrated, because he feels his
never getting out of the hospital. Denies si/hi. denies pain at this time. pt
is currently in the day room. Will continue to monitor.

## 2021-10-28 VITALS — SYSTOLIC BLOOD PRESSURE: 121 MMHG | DIASTOLIC BLOOD PRESSURE: 74 MMHG

## 2021-10-28 VITALS — DIASTOLIC BLOOD PRESSURE: 74 MMHG | SYSTOLIC BLOOD PRESSURE: 121 MMHG

## 2021-10-28 VITALS — SYSTOLIC BLOOD PRESSURE: 106 MMHG | DIASTOLIC BLOOD PRESSURE: 56 MMHG

## 2021-10-28 NOTE — NUR
Updates for placement are as follows:
 
[facility] - clinically accepted.
[facility] - Referral denied. Medicaid memory care.
Private pay only.
[facility] - Says will accept pt.
[facility] - referral resent
[facility] - Denied. Patient does not meet the culture there as he is
not aggressive nor violent enough.
[facility] - Denied. Unable to meet needs. Says they do not have  a locked
unit. SW explained he does not need one. They still denied patient.
[facility] - reviewing referral. Asked for documentation from
10/12-10/22. Notes have been sent. 10/27
[facility] - reviewing referral. But not sure they can accept him for tomorrow.
Says will see if the skilled facility they are working with will accept.
Working with [facility] for a possible skilled stay. If accepted they will be
able to accept pt on 10/29.
 
SW team will continue to follow pt during his stay on this unit.

## 2021-10-28 NOTE — NUR
Assumed care from overnight shift this am. Client was in gerichair sitting on
with blanket around self. Client was A&O x 0, stating that he did not know his
name, where he was, what year it was, or the date. Client was told this
information, but was unable to verbalize it back to nurse. Client denied
depression and anxiety, stating that he was fine. Client stated "not now" when
asked about any suicidal thoughts. Client stated no when asked about homicidal
thoughts. Client did not want to answer questions about audio or visual
hallucinations at this time, nodding head to side, but not verbalizing it
despite prompting. Client denied any pain at this time. Client bowel sounds
present; bowel sounds normal. Client took at medications during this time. No
further issues noted. Will continue to monitor for pt safety and concerns.

## 2021-10-28 NOTE — NUR
10-27-21 CARE TRANSFERRED 1900 OBSERVED PT RESTING IN BED WITH EYES CLOSED.
LATER PT AAOX3, VSS, RR EVEN AND NONLABORED ON RA. PT DENIES SI/HI AND PAIN.
PT PRESENTS CALM AND COOPERATIVE. DURING MEDICATIN ADMIN PT HAD NO
DIFFICULTIES. LATER PT BED ADJUSTED FOR COMFORT, LOWEST POSITON, LOCKED AND
ALARM ON. PT IWLL CONTINUE TO BE MONITOR PER [facility] PROTOCOL.

## 2021-10-29 VITALS — DIASTOLIC BLOOD PRESSURE: 72 MMHG | SYSTOLIC BLOOD PRESSURE: 129 MMHG

## 2021-10-29 NOTE — NUR
MADELEINE recieved a call from CLAIRE at [facility] requested and admitting order.
MADELEINE informed that this is not something the hospital does. What is usually sent
is the discharge summary.  The DON was demanding something from the doctor or
she could not accept the Pt, stating " this is reuired by my administration".
Dr. Agosto was able to write a note stating the Pt was discharged.  This
note was faxed to the facility and placed in the Pt's chart.

## 2021-10-29 NOTE — NUR
Patient care resummed, patient was in bed upon shift change. Patient did
attend all groups and breakfast and lunch were eaten in the dayroom at the
table where he sat with other patients laughing and talking. Patient presented
to LPN this morning with flat expression, sad, confused and forgetful. Patient
A&O*2 (self,time), VSS, lungs clear and unlabored, abdomen soft with bowel
sounds present. Patient meal and medication compliant. Patient voiced to LPN
some frustration about still being here. Patient denies SI/HI/AVH/Pain.
Patient voiced depression and anxiety but nothing more than usual. Patient is
a high fall risk and precautions are in place.
 
Patient asked to call his wife at 1020 and staff retreved the patients cell
phone and took patient to his room to call on his personal cell phone. PCA/LPN
stayed with patient during phone call. Patient verbalized to his wifi multiple
SI comments. Patient stated first the "I will not eat anything until you come
get me from this place, if you don't you will never see me again." Then
patient proceeded to say "I am going to jump off this roof." LPN interved and
informed the patient that if he continues to make SI comments he won't be able
to go home. LPN then stated to patient that if he is feeling like this, that I
need him to inform LPN so we can get him help. Patient understood, and stated
he was sorry, that he is just sad and frustrated. LPN allowed patient to
finish phonecall and will be reaching out to patients wife shortly per wifes
request.
 
Patient will be discharged today via Doctors orders to a memory care facility
at 1500 via express transportation. will continue to monitor patient for
safety and behaviors.

## 2021-10-29 NOTE — NUR
PATIENT IS PLEASANT AND IS ORIENTED TO PERSON AND TIME BUT COULD NOT TELL ME
WHERE HE WAS AT. PATIENT STATES HE HAS LOTS OF DEPRESSION AND ANXIETY AND HAS
PROBLEMS WITH WET BRITCHES AND THAT HE KNEW HE WAS CAUSING PROBLEMS BECAUSE HE
ASK TO SPEAK WITH HIS PARENTS. PATIENT IS ALSO FRUSTRATED AT STILL BEING HERE.
PATIENT WAS MEDICATION COMPLIANT AND HAS SLEPT THROUGH THE NIGHT.

## 2021-10-30 NOTE — D
[facility]
[address]
[place]   [address]                     DISCHARGE SUMMARY             
_______________________________________________________________________________
 
Name:       TAMIKO DELCID                  Room #:         [address]A      [facility] [place]  
M.R.#:      [id number]                       Account #:      [id number]  
Admission:  10/11/21    Attend Phys:    Roni Natarajan DO
Discharge:  10/29/21    Date of Birth:  [birth year]  
                                                          Report #: [id number]
                                                                    [id number] 
_______________________________________________________________________________
THIS REPORT FOR:  
 
cc:  EREN - Family physician unknown
     FAM - Family physician unknown                                       
     Roni Natarajan DO                                        ~
DATE OF SERVICE: 10/29/2021
 
INPATIENT PSYCHIATRIC DISCHARGE SUMMARY
 
ATTENDING PSYCHIATRIST:  Roni Natarajan DO
 
MEDICAL CONSULTANT:  Roni Valles MD
 
DISCHARGE DIAGNOSES:  Major neurocognitive disorder, likely due to 
cerebrovascular disease with behavioral disturbance, improved; history of major 
depression.
 
MEDICAL COMORBIDITIES:  Include fall this spring with head injury, status post 
intracranial bleed; history of right arm fracture, status post surgical repair; 
decreased motion of that arm; hypertension, hyperlipidemia, gastroesophageal 
reflux disease.  The patient is discharging to [facility] for memory care, 
psychiatric and medical care per receiving facility.
 
DISCHARGE MEDICATIONS:  As follows:  Fluticasone nasal 2 sprays each nostril 
twice daily for allergies, tamsulosin 0.4 mg oral at bedtime for BPH, ferrous 
sulfate 325 mg oral daily, atorvastatin 40 mg oral daily for hyperlipidemia, 
nebivolol 2.5 mg oral daily for hypertension, olmesartan medoxomil 10 mg oral 
daily for hypertension, aspirin 81 mg oral daily for heart protection, Depakote 
 mg oral twice daily for mood stabilization, Effexor  mg oral daily 
for depression, the artificial tears every 4 hours as needed for dry eyes, 
calcium carbonate with vitamin D3 three times a day with meals, pantoprazole 40 
mg oral daily for GERD, vitamin D3 cholecalciferol 5000 international units oral
daily for supplementation.
 
LABORATORY DATA:  Significant laboratories this admission.  White count 16.0 on 
10/11/2021, H and H 13.3 and 39.9, platelet count 201.  Chemistries this 
admission, sodium 139, potassium 4.7, chloride 101, bicarbonate 29, anion gap 9,
BUN 18, creatinine 1.0, estimated GFR 72, glucose 132.  A1c 5.5, calcium 9.3, 
triglycerides 75, cholesterol 96, LDL 44, HDL 37.  B12 level 1293.  Vitamin D 
low at 18.6.  Urinalysis showed bacteria and light mucus.  Depakote level 74 on 
10/24/2021.  COVID-19 serology was negative on 10/23/2021.  Syphilis was 
negative on 10/19/2021.  Influenza A and B was negative on 10/13/2021.  EKG was 
done on this admission, ventricular rate 84, MD interval 155 milliseconds, QT 
353 milliseconds, QTc 418, sinus rhythm, inferior infarct, age indeterminate 
that was read.
 
 
 
 
[address]   [address]                     DISCHARGE SUMMARY             
_______________________________________________________________________________
 
Name:       TAMIKO DELCID                  Room #:         528A-A      [facility] [place]  
M.R.#:      [id number]                       Account #:      [id number]  
Admission:  10/11/21    Attend Phys:    Roni Natarajan DO
Discharge:  10/29/21    Date of Birth:  [birth year]  
                                                          Report #: [id number]
                                                                    [id number] 
_______________________________________________________________________________
He is a standby assist with a walker for ambulation.  Diet is regular.
 
REASON FOR ADMISSION:  An 81-year-old  male brought by his family to 
the ED.  The patient has had increased confusion for 1 week, lives with his wife
at home.
 
HOSPITAL COURSE:  The patient was admitted to Geriatric Psychiatry Unit.  We 
completed a multidisciplinary evaluation including OT, PT, psychiatry.  The 
patient scored I believe a 15/30 on the SLUMS.  That along with our clinical 
observations led to the diagnosis of major neurocognitive disorder due to 
cerebrovascular disease.  The patient has had a fall with a hemorrhagic bleed in
his brain in the spring of this year.  The patient improved on this admission.  
Physical and occupational therapy worked with him fairly extensively.  
Recommendations; had several family meetings with his wife and the patient's 
son.  DPOA was enacted as agreement
for placing him in long-term care facility at
[facility] given his vascular dementia.
 
PHYSICAL EXAMINATION:
On the day of discharge, he is not suicidal or homicidal.  Temperature 36.4, 
pulse 77, respirations 17, and /72.
MUSCULOSKELETAL:  Assisted gait with walker, standby assist.
MENTAL STATUS EXAMINATION:  This is a well-developed, fairly nourished 
male apparently stated age.  Attention fair.  Concentration limited.  He is hard
of hearing.  Speech soft, normal rate.  Thought process:  Linear and goal 
directed.  Thought content, focused on discharge.  Denied SI or HI.  Denied 
auditory, visual, or tactile hallucinations.  Mood and affect was okay, 
congruent, constricted.  Memory known to be impaired.  Insight limited.  
Judgment limited.  Fund of knowledge, no greater than average.
 
PROGNOSIS:  For this patient is guarded given his age of 81 and having 
neurodegenerative disorder.
 
 
 
 
 
 
 
 
 
 
 
 
  <ELECTRONICALLY SIGNED>
   By: Roni Natarajan DO        
  10/30/21     2301
D: 10/29/21 1805                           _____________________________________
T: 10/29/21 2246                           Roni Natarajan DO          /nt